# Patient Record
Sex: FEMALE | Race: BLACK OR AFRICAN AMERICAN | NOT HISPANIC OR LATINO | Employment: FULL TIME | URBAN - METROPOLITAN AREA
[De-identification: names, ages, dates, MRNs, and addresses within clinical notes are randomized per-mention and may not be internally consistent; named-entity substitution may affect disease eponyms.]

---

## 2023-04-05 ENCOUNTER — OFFICE VISIT (OUTPATIENT)
Dept: PRIMARY CARE | Facility: CLINIC | Age: 38
End: 2023-04-05
Payer: COMMERCIAL

## 2023-04-05 VITALS
HEIGHT: 66 IN | SYSTOLIC BLOOD PRESSURE: 118 MMHG | BODY MASS INDEX: 26.53 KG/M2 | TEMPERATURE: 97.6 F | HEART RATE: 78 BPM | OXYGEN SATURATION: 97 % | WEIGHT: 165.1 LBS | RESPIRATION RATE: 16 BRPM | DIASTOLIC BLOOD PRESSURE: 80 MMHG

## 2023-04-05 DIAGNOSIS — M54.50 LOW BACK PAIN, UNSPECIFIED BACK PAIN LATERALITY, UNSPECIFIED CHRONICITY, UNSPECIFIED WHETHER SCIATICA PRESENT: ICD-10-CM

## 2023-04-05 DIAGNOSIS — G43.909 MIGRAINE WITHOUT STATUS MIGRAINOSUS, NOT INTRACTABLE, UNSPECIFIED MIGRAINE TYPE: Primary | ICD-10-CM

## 2023-04-05 DIAGNOSIS — Z23 NEED FOR DTAP VACCINATION: ICD-10-CM

## 2023-04-05 DIAGNOSIS — F41.9 ANXIETY: ICD-10-CM

## 2023-04-05 PROBLEM — B36.0 TINEA VERSICOLOR: Status: ACTIVE | Noted: 2023-04-05

## 2023-04-05 PROBLEM — E55.9 VITAMIN D DEFICIENCY, UNSPECIFIED: Status: ACTIVE | Noted: 2023-04-05

## 2023-04-05 PROBLEM — L30.9 ECZEMA: Status: ACTIVE | Noted: 2023-04-05

## 2023-04-05 PROCEDURE — 90472 IMMUNIZATION ADMIN EACH ADD: CPT | Performed by: FAMILY MEDICINE

## 2023-04-05 PROCEDURE — 90739 HEPB VACC 2/4 DOSE ADULT IM: CPT | Performed by: FAMILY MEDICINE

## 2023-04-05 PROCEDURE — 1036F TOBACCO NON-USER: CPT | Performed by: FAMILY MEDICINE

## 2023-04-05 PROCEDURE — 99214 OFFICE O/P EST MOD 30 MIN: CPT | Performed by: FAMILY MEDICINE

## 2023-04-05 PROCEDURE — 90471 IMMUNIZATION ADMIN: CPT | Performed by: FAMILY MEDICINE

## 2023-04-05 PROCEDURE — 90715 TDAP VACCINE 7 YRS/> IM: CPT | Performed by: FAMILY MEDICINE

## 2023-04-05 RX ORDER — GABAPENTIN 300 MG/1
300 CAPSULE ORAL NIGHTLY
COMMUNITY
Start: 2022-10-31 | End: 2023-04-05 | Stop reason: ALTCHOICE

## 2023-04-05 RX ORDER — DULOXETIN HYDROCHLORIDE 30 MG/1
30 CAPSULE, DELAYED RELEASE ORAL DAILY
Qty: 60 CAPSULE | Refills: 0 | Status: SHIPPED | OUTPATIENT
Start: 2023-04-05 | End: 2023-05-23 | Stop reason: SDUPTHER

## 2023-04-05 RX ORDER — LORAZEPAM 1 MG/1
TABLET ORAL
COMMUNITY
Start: 2023-01-11 | End: 2023-10-10 | Stop reason: ALTCHOICE

## 2023-04-05 RX ORDER — RIZATRIPTAN BENZOATE 5 MG/1
TABLET ORAL
COMMUNITY
Start: 2022-10-31 | End: 2023-04-05 | Stop reason: ALTCHOICE

## 2023-04-05 RX ORDER — KETOCONAZOLE 20 MG/ML
SHAMPOO, SUSPENSION TOPICAL
COMMUNITY
Start: 2020-10-22 | End: 2023-10-10 | Stop reason: SDUPTHER

## 2023-04-05 RX ORDER — TRIAMCINOLONE ACETONIDE 1 MG/G
OINTMENT TOPICAL
COMMUNITY
Start: 2020-10-22 | End: 2023-10-10 | Stop reason: SDUPTHER

## 2023-04-05 RX ORDER — IBUPROFEN 600 MG/1
1 TABLET ORAL EVERY 6 HOURS PRN
COMMUNITY
Start: 2022-12-22 | End: 2023-11-16 | Stop reason: ALTCHOICE

## 2023-04-05 ASSESSMENT — PATIENT HEALTH QUESTIONNAIRE - PHQ9
2. FEELING DOWN, DEPRESSED OR HOPELESS: NOT AT ALL
SUM OF ALL RESPONSES TO PHQ9 QUESTIONS 1 AND 2: 0
1. LITTLE INTEREST OR PLEASURE IN DOING THINGS: NOT AT ALL

## 2023-04-05 ASSESSMENT — LIFESTYLE VARIABLES
HOW OFTEN DO YOU HAVE A DRINK CONTAINING ALCOHOL: NEVER
HOW OFTEN DO YOU HAVE SIX OR MORE DRINKS ON ONE OCCASION: NEVER

## 2023-04-05 NOTE — PATIENT INSTRUCTIONS
*Please accept my condolences for the loss of your loved one*    TDAP and hEP b #2 TODAY         MIGRAINES  - stop gabapentin since causing side effects  - start duloxetine to help with anxiety as well  -To call for referral for neurology headache specialist  -CT head reviewed from [facility]-was normal  -Distress can help!    Back pain  -Referral for physical therapy  -Try yoga and stretches to help as well  -Duloxetine can help as well    Anxiety  -Trial of duloxetine  -Can consider counseling if no improvement-we can discuss at her next appointment        Please follow up in 3 months for wellness or as needed.       ** If labs or imaging ordered at today's visit, all the non-urgent results will be discussed at your next visit    If you have been referred for a special test or to a specialist please call  [phone] to schedule an appointment.  If you have any further questions, or if develop new or worsened symptoms, please give our office a call at [phone].     Ways to Help Prevent Falls at Home    Quick Tips   ? Ask for help if you need it. Most people want to help!   ? Get up slowly after sitting or laying down   ? Wear a medical alert device or keep cell phone in your pocket   ? Use night lights, especially areas near a bathroom   ? Keep the items you use often within reach on a small stool or end table   ? Use an assistive device such as walker or cane, as directed by provider/physical therapy   ? Use a non-slip mat and grab bars in your bathroom. Look for home health sections for best options     Other Areas to Focus On   ? Exercise and nutrition: Regular exercise or taking a falls prevention class are great ways improve strength and balance. Don’t forget to stay hydrated and bring a snack!   ? Medicine side effects: Some medicines can make you sleepy or dizzy, which could cause a fall. Ask your healthcare provider about the side effects your medicines could cause. Be sure to let them know if  you take any vitamins or supplements as well.   ? Tripping hazards: Remove items you could trip on, such as loose mats, rugs, cords, and clutter. Wear closed toe shoes with rubber soles.   ? Health and wellness: Get regular checkups with your healthcare provider, plus routine vision and hearing screenings. Talk with your healthcare provider about:   o Your medicines and the possible side effects - bring them in a bag if that is easier!   o Problems with balance or feeling dizzy   o Ways to promote bone health, such as Vitamin D and calcium supplements   o Questions or concerns about falling     *Ask your healthcare team if you have questions     [facility], 2022         Ways to Help Prevent Falls at Home    Quick Tips   ? Ask for help if you need it. Most people want to help!   ? Get up slowly after sitting or laying down   ? Wear a medical alert device or keep cell phone in your pocket   ? Use night lights, especially areas near a bathroom   ? Keep the items you use often within reach on a small stool or end table   ? Use an assistive device such as walker or cane, as directed by provider/physical therapy   ? Use a non-slip mat and grab bars in your bathroom. Look for home health sections for best options     Other Areas to Focus On   ? Exercise and nutrition: Regular exercise or taking a falls prevention class are great ways improve strength and balance. Don’t forget to stay hydrated and bring a snack!   ? Medicine side effects: Some medicines can make you sleepy or dizzy, which could cause a fall. Ask your healthcare provider about the side effects your medicines could cause. Be sure to let them know if you take any vitamins or supplements as well.   ? Tripping hazards: Remove items you could trip on, such as loose mats, rugs, cords, and clutter. Wear closed toe shoes with rubber soles.   ? Health and wellness: Get regular checkups with your healthcare provider, plus routine vision and hearing  screenings. Talk with your healthcare provider about:   o Your medicines and the possible side effects - bring them in a bag if that is easier!   o Problems with balance or feeling dizzy   o Ways to promote bone health, such as Vitamin D and calcium supplements   o Questions or concerns about falling     *Ask your healthcare team if you have questions     ©[facility], 2022

## 2023-04-05 NOTE — PROGRESS NOTES
"Subjective   Patient ID: Jill Toledo is a 38 y.o. female who presents for Migraine.    Migraine        Due for hep b #2 and tdap  Migraines-  \"are horrible\"   was with a falland went to [facility] which they did do a CT of the head.  Reviewed on patient's [employer] account.  CT head was normal  Completed FMLA in the past for her headaches  At last appointment we started her on Gabapentin 300 mg at night- states \"I didn't like the way it feel\" and \"I dont feel like myself while using'  States it triggers her anxiety which has worsened.    Was given rizatriptan which she states did not help with the pain either.  Does not want to use any medications.  Also states her headaches are worsened around her monthly menstrual cycle.  Seen by gynecology and was with D&C/ablation to help with her heavy menstrual periods.  Declined using birth control  Referral was placed to see neurology but she had a death in her family, her father .  tried Amitriptyline in the past- made her too drowsy in the am  unsure if helped  .Onset of headaches:  Frequency of headaches (days per month): normally getting around period time, or can be 2-3times  Duration of headaches (average): nml lasting 1 day, but now lasting 2-3day  Severity of headaches (out of 10): 7-8/10  Aura:sometimes   Nausea/vomiting: +   Photophobia: +  Phonophobia: +    PMSFH was reviewed & updated.      ETOH - only social and very minimal (states father is EOTH)   Drugs - THC (daily) - not ready to quit yet  Tobacco - quit!!      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      Objective   /80 (Patient Position: Sitting)   Pulse 78   Temp 36.4 °C (97.6 °F)   Resp 16   Ht 1.676 m (5' 6\")   Wt " 74.9 kg (165 lb 1.6 oz)   SpO2 97%   BMI 26.65 kg/m²     Physical Exam  Vitals reviewed.   Constitutional:       Appearance: Normal appearance.   HENT:      Head: Normocephalic.   Eyes:      Extraocular Movements: Extraocular movements intact.   Cardiovascular:      Rate and Rhythm: Normal rate and regular rhythm.      Heart sounds: Normal heart sounds. No murmur heard.  Pulmonary:      Effort: Pulmonary effort is normal. No respiratory distress.      Breath sounds: Normal breath sounds. No wheezing.   Abdominal:      General: Bowel sounds are normal. There is no distension.      Palpations: Abdomen is soft.   Skin:     General: Skin is warm and dry.   Neurological:      General: No focal deficit present.      Mental Status: She is alert and oriented to person, place, and time.   Psychiatric:         Mood and Affect: Mood normal.         Behavior: Behavior normal.         Thought Content: Thought content normal.         Judgment: Judgment normal.         Assessment/Plan   Problem List Items Addressed This Visit          Other    Migraines - Primary    Relevant Medications    DULoxetine (Cymbalta) 30 mg DR capsule    Other Relevant Orders    CBC and Auto Differential    Comprehensive Metabolic Panel    Lipid Panel    TSH with reflex to Free T4 if abnormal    Referral to Neurology     Other Visit Diagnoses       Need for DTaP vaccination        Relevant Orders    Hepatitis B vaccine, adult (HEPLISAV)    Tdap vaccine, age 10 years and older (BOOSTRIX)    Anxiety        Relevant Medications    DULoxetine (Cymbalta) 30 mg DR capsule    Low back pain, unspecified back pain laterality, unspecified chronicity, unspecified whether sciatica present        Relevant Orders    Referral to Physical Therapy             Patient was identified as a fall risk. Risk prevention instructions provided.Patient was identified as a fall risk. Risk prevention instructions provided.

## 2023-05-23 DIAGNOSIS — F41.9 ANXIETY: ICD-10-CM

## 2023-05-23 DIAGNOSIS — G43.909 MIGRAINE WITHOUT STATUS MIGRAINOSUS, NOT INTRACTABLE, UNSPECIFIED MIGRAINE TYPE: ICD-10-CM

## 2023-05-24 RX ORDER — DULOXETIN HYDROCHLORIDE 60 MG/1
60 CAPSULE, DELAYED RELEASE ORAL DAILY
Qty: 90 CAPSULE | Refills: 0 | Status: SHIPPED | OUTPATIENT
Start: 2023-05-24 | End: 2023-10-10 | Stop reason: ALTCHOICE

## 2023-07-27 ENCOUNTER — APPOINTMENT (OUTPATIENT)
Dept: PRIMARY CARE | Facility: CLINIC | Age: 38
End: 2023-07-27
Payer: COMMERCIAL

## 2023-09-20 ENCOUNTER — TELEPHONE (OUTPATIENT)
Dept: PRIMARY CARE | Facility: CLINIC | Age: 38
End: 2023-09-20

## 2023-09-20 NOTE — TELEPHONE ENCOUNTER
Patient stated the following:  Constant migraines for over 4 days     Patient stated that she has been doing well for 3 months and the headaches have recently returned     Patient stated that she has been taking Excedrin Migraine relief and they have not been helping    Patient has the following appointments scheduled:  Physical - 10/10  Follow up - 01/26    Is patient ok for sick/7:40 slot? Or VV

## 2023-10-10 ENCOUNTER — OFFICE VISIT (OUTPATIENT)
Dept: PRIMARY CARE | Facility: CLINIC | Age: 38
End: 2023-10-10
Payer: COMMERCIAL

## 2023-10-10 VITALS
BODY MASS INDEX: 25.38 KG/M2 | OXYGEN SATURATION: 98 % | SYSTOLIC BLOOD PRESSURE: 104 MMHG | WEIGHT: 157.9 LBS | HEIGHT: 66 IN | RESPIRATION RATE: 15 BRPM | TEMPERATURE: 97.4 F | HEART RATE: 74 BPM | DIASTOLIC BLOOD PRESSURE: 82 MMHG

## 2023-10-10 DIAGNOSIS — Z23 NEED FOR VACCINATION: ICD-10-CM

## 2023-10-10 DIAGNOSIS — G43.909 MIGRAINE WITHOUT STATUS MIGRAINOSUS, NOT INTRACTABLE, UNSPECIFIED MIGRAINE TYPE: ICD-10-CM

## 2023-10-10 DIAGNOSIS — Z80.0 FAMILY HISTORY OF STOMACH CANCER: ICD-10-CM

## 2023-10-10 DIAGNOSIS — L30.9 ECZEMA, UNSPECIFIED TYPE: ICD-10-CM

## 2023-10-10 DIAGNOSIS — Z00.00 WELLNESS EXAMINATION: Primary | ICD-10-CM

## 2023-10-10 PROBLEM — F41.9 ANXIETY: Status: ACTIVE | Noted: 2023-02-04

## 2023-10-10 PROCEDURE — 90472 IMMUNIZATION ADMIN EACH ADD: CPT | Performed by: FAMILY MEDICINE

## 2023-10-10 PROCEDURE — 99396 PREV VISIT EST AGE 40-64: CPT | Performed by: FAMILY MEDICINE

## 2023-10-10 PROCEDURE — 90471 IMMUNIZATION ADMIN: CPT | Performed by: FAMILY MEDICINE

## 2023-10-10 PROCEDURE — 90686 IIV4 VACC NO PRSV 0.5 ML IM: CPT | Performed by: FAMILY MEDICINE

## 2023-10-10 PROCEDURE — 90651 9VHPV VACCINE 2/3 DOSE IM: CPT | Performed by: FAMILY MEDICINE

## 2023-10-10 PROCEDURE — 99213 OFFICE O/P EST LOW 20 MIN: CPT | Performed by: FAMILY MEDICINE

## 2023-10-10 PROCEDURE — 1036F TOBACCO NON-USER: CPT | Performed by: FAMILY MEDICINE

## 2023-10-10 RX ORDER — KETOCONAZOLE 20 MG/ML
SHAMPOO, SUSPENSION TOPICAL
Qty: 120 ML | Refills: 0 | Status: SHIPPED | OUTPATIENT
Start: 2023-10-10 | End: 2023-11-16 | Stop reason: ALTCHOICE

## 2023-10-10 RX ORDER — ACETAMINOPHEN 325 MG/1
650 TABLET ORAL EVERY 6 HOURS PRN
COMMUNITY
Start: 2023-02-10 | End: 2023-11-16 | Stop reason: ALTCHOICE

## 2023-10-10 RX ORDER — TRIAMCINOLONE ACETONIDE 1 MG/G
OINTMENT TOPICAL
Qty: 453 G | Refills: 0 | Status: SHIPPED | OUTPATIENT
Start: 2023-10-10 | End: 2023-11-16 | Stop reason: ALTCHOICE

## 2023-10-10 ASSESSMENT — LIFESTYLE VARIABLES
HOW OFTEN DO YOU HAVE SIX OR MORE DRINKS ON ONE OCCASION: NEVER
SKIP TO QUESTIONS 9-10: 1
HOW OFTEN DO YOU HAVE A DRINK CONTAINING ALCOHOL: NEVER
AUDIT-C TOTAL SCORE: 0
HOW MANY STANDARD DRINKS CONTAINING ALCOHOL DO YOU HAVE ON A TYPICAL DAY: PATIENT DOES NOT DRINK

## 2023-10-10 ASSESSMENT — PATIENT HEALTH QUESTIONNAIRE - PHQ9
SUM OF ALL RESPONSES TO PHQ9 QUESTIONS 1 & 2: 0
2. FEELING DOWN, DEPRESSED OR HOPELESS: NOT AT ALL
1. LITTLE INTEREST OR PLEASURE IN DOING THINGS: NOT AT ALL

## 2023-10-10 NOTE — PROGRESS NOTES
"Subjective   Patient ID: Jill Toledo is a 38 y.o. female who presents for Annual Exam.    HPI   Here for a physical  Does not want a chaperone.     Other concerns/issues/followup:  1 - Headache  Has worsened-lasting 1 wk ( after increased stress due to multiple deaths in her family.since then her headaches have improved   In the past has tried multiple treatments including sumatriptan, rizatriptan, duloxetine, gabapentin, amitriptyline -all either caused side effects or did not help her with her pain at all.  Referrals made to neurology in the past was not able to follow-up however     2 -multiple cancer deaths in her family including her grandmother, 2 aunts and a cousin all with stomach cancer   Denies any abdominal pain, change in bowel movements, nausea, vomiting or weight loss.     [facility] was reviewed & updated.        ---Social---  Job: [employer] - [employer]  :  1yr now  Kids: 2 kids (11/12) and (3/12)- has custody of her family's children  Likes: loves motivating people, time with kids  No foreign travel plans         ETOH - none  Drugs - none  Tobacco - none      Review of Systems  All systems reviewed and neg if not noted in the HPI above    Objective   /82   Pulse 74   Temp 36.3 °C (97.4 °F)   Resp 15   Ht 1.676 m (5' 6\")   Wt 71.6 kg (157 lb 14.4 oz)   SpO2 98%   BMI 25.49 kg/m²     Physical Exam  Pleasant  Eyes: conjunctiva non-icteric and eye lids are without obvious rash or drooping. Pupils are symmetric.   Ears, Nose, Mouth, and Throat: External ears and nose appear to be without deformity or rash. No lesions or masses noted. Hearing is grossly intact.   CV: RRR, no murmur  Carotids: no bruits  Pulm:CTA B/L  Abd: soft, NTTP, + BS  LE: no edema  Psychiatric: Alert, orientation to person, place, and time. Recent/remote memory as evidenced through face-to-face interaction and discussion appear grossly intact. Mood and affect are normal.     Assessment/Plan   Problem List " Items Addressed This Visit             ICD-10-CM    Eczema L30.9    Relevant Medications    ketoconazole (NIZOral) 2 % shampoo    triamcinolone (Kenalog) 0.1 % ointment    Migraines G43.909    Relevant Orders    Referral to Neurology     Other Visit Diagnoses         Codes    Wellness examination    -  Primary Z00.00    Need for vaccination     Z23    Family history of stomach cancer     Z80.0    Relevant Orders    Referral to Genetics               Please follow up in  1 yr wellness  or as needed.

## 2023-10-10 NOTE — PATIENT INSTRUCTIONS
*Please accept my condolences for the loss of your loved one*    HPV and FLU today    Labs today    Headaches  - referral neurology again    Fm hx of cancers  -Referral for genetic      Please follow up in  1 yr wellness  or as needed.       ** If labs or imaging ordered at today's visit, all the non-urgent results will be discussed at your next visit    If you have been referred for a special test or to a specialist please call  [phone] to schedule an appointment.  If you have any further questions, or if develop new or worsened symptoms, please give our office a call at [phone].

## 2023-10-17 ENCOUNTER — HOSPITAL ENCOUNTER (EMERGENCY)
Facility: HOSPITAL | Age: 38
Discharge: HOME | End: 2023-10-17
Attending: EMERGENCY MEDICINE
Payer: COMMERCIAL

## 2023-10-17 VITALS
WEIGHT: 157 LBS | TEMPERATURE: 98.1 F | SYSTOLIC BLOOD PRESSURE: 108 MMHG | RESPIRATION RATE: 16 BRPM | BODY MASS INDEX: 26.16 KG/M2 | DIASTOLIC BLOOD PRESSURE: 71 MMHG | OXYGEN SATURATION: 100 % | HEIGHT: 65 IN | HEART RATE: 73 BPM

## 2023-10-17 DIAGNOSIS — G43.909 MIGRAINE WITHOUT STATUS MIGRAINOSUS, NOT INTRACTABLE, UNSPECIFIED MIGRAINE TYPE: Primary | ICD-10-CM

## 2023-10-17 PROCEDURE — 96375 TX/PRO/DX INJ NEW DRUG ADDON: CPT

## 2023-10-17 PROCEDURE — 99284 EMERGENCY DEPT VISIT MOD MDM: CPT | Mod: 25

## 2023-10-17 PROCEDURE — 96374 THER/PROPH/DIAG INJ IV PUSH: CPT

## 2023-10-17 PROCEDURE — 96361 HYDRATE IV INFUSION ADD-ON: CPT

## 2023-10-17 PROCEDURE — 2500000004 HC RX 250 GENERAL PHARMACY W/ HCPCS (ALT 636 FOR OP/ED): Performed by: EMERGENCY MEDICINE

## 2023-10-17 RX ORDER — KETOROLAC TROMETHAMINE 30 MG/ML
15 INJECTION, SOLUTION INTRAMUSCULAR; INTRAVENOUS ONCE
Status: COMPLETED | OUTPATIENT
Start: 2023-10-17 | End: 2023-10-17

## 2023-10-17 RX ORDER — METOCLOPRAMIDE 10 MG/1
10 TABLET ORAL EVERY 8 HOURS PRN
Qty: 30 TABLET | Refills: 0 | Status: SHIPPED | OUTPATIENT
Start: 2023-10-17 | End: 2023-11-16 | Stop reason: ALTCHOICE

## 2023-10-17 RX ORDER — DIPHENHYDRAMINE HYDROCHLORIDE 50 MG/ML
25 INJECTION INTRAMUSCULAR; INTRAVENOUS ONCE
Status: COMPLETED | OUTPATIENT
Start: 2023-10-17 | End: 2023-10-17

## 2023-10-17 RX ORDER — METOCLOPRAMIDE HYDROCHLORIDE 5 MG/ML
10 INJECTION INTRAMUSCULAR; INTRAVENOUS ONCE
Status: COMPLETED | OUTPATIENT
Start: 2023-10-17 | End: 2023-10-17

## 2023-10-17 RX ADMIN — SODIUM CHLORIDE 1000 ML: 900 INJECTION, SOLUTION INTRAVENOUS at 21:59

## 2023-10-17 RX ADMIN — METOCLOPRAMIDE HYDROCHLORIDE 10 MG: 5 INJECTION INTRAMUSCULAR; INTRAVENOUS at 21:59

## 2023-10-17 RX ADMIN — KETOROLAC TROMETHAMINE 15 MG: 30 INJECTION, SOLUTION INTRAMUSCULAR; INTRAVENOUS at 21:59

## 2023-10-17 RX ADMIN — DIPHENHYDRAMINE HYDROCHLORIDE 25 MG: 50 INJECTION, SOLUTION INTRAMUSCULAR; INTRAVENOUS at 21:59

## 2023-10-17 ASSESSMENT — COLUMBIA-SUICIDE SEVERITY RATING SCALE - C-SSRS
6. HAVE YOU EVER DONE ANYTHING, STARTED TO DO ANYTHING, OR PREPARED TO DO ANYTHING TO END YOUR LIFE?: NO
1. IN THE PAST MONTH, HAVE YOU WISHED YOU WERE DEAD OR WISHED YOU COULD GO TO SLEEP AND NOT WAKE UP?: NO
2. HAVE YOU ACTUALLY HAD ANY THOUGHTS OF KILLING YOURSELF?: NO

## 2023-10-17 ASSESSMENT — LIFESTYLE VARIABLES
REASON UNABLE TO ASSESS: NO
HAVE YOU EVER FELT YOU SHOULD CUT DOWN ON YOUR DRINKING: NO
EVER HAD A DRINK FIRST THING IN THE MORNING TO STEADY YOUR NERVES TO GET RID OF A HANGOVER: NO
HAVE PEOPLE ANNOYED YOU BY CRITICIZING YOUR DRINKING: NO
EVER FELT BAD OR GUILTY ABOUT YOUR DRINKING: NO

## 2023-10-17 ASSESSMENT — PAIN DESCRIPTION - PAIN TYPE: TYPE: ACUTE PAIN

## 2023-10-17 ASSESSMENT — PAIN - FUNCTIONAL ASSESSMENT: PAIN_FUNCTIONAL_ASSESSMENT: 0-10

## 2023-10-17 ASSESSMENT — PAIN SCALES - GENERAL: PAINLEVEL_OUTOF10: 8

## 2023-10-17 NOTE — Clinical Note
Jill Toledo was seen and treated in our emergency department on 10/17/2023.  She may return to work on 10/19/2023.       If you have any questions or concerns, please don't hesitate to call.      Nataliia Wilson MD

## 2023-10-18 NOTE — ED PROVIDER NOTES
HPI   Chief Complaint   Patient presents with    Headache     Pt to ED for migraine that started on  night while sleeping. Pt woke up yesterday had nausea and vomited. Pt woke up and went to work today and felt that the pain continued to increase. Pt has been taking excedrin migraine.       38-year-old female with a history of migraine headaches comes to the emergency department with a chief complaint of headache.  Over the last few days the patient has had an intractable headache described as left-sided associated with nausea and vomiting and aura.  She had similar headaches prior to getting a uterine ablation for abnormal menses.  She had been well controlled for several months, but over the last couple of months she has had recurrent headaches and has a follow-up appointment with a neurologist.  She comes in today because this headache has not improved with her normal medications.  It is not the worst headache of her life, did not come on suddenly and did not start while she was under exertion.  She denies any focal neurological deficits including weakness, vision loss, confusion or sensory deficit      History provided by:  Patient   used: No                        Harman Coma Scale Score: 15                  Patient History   Past Medical History:   Diagnosis Date    Other signs and symptoms in breast 10/25/2020    Nipple problem    Personal history of other mental and behavioral disorders 10/22/2020    History of depression    Tobacco abuse counseling 10/22/2020    Encounter for smoking cessation counseling     Past Surgical History:   Procedure Laterality Date    OTHER SURGICAL HISTORY  10/22/2020    Tubal ligation bilateral    OTHER SURGICAL HISTORY  10/22/2020     section     Family History   Problem Relation Name Age of Onset    Hypertension Mother      Alcohol abuse Father      Kidney failure Father       Social History     Tobacco Use    Smoking status: Never     Smokeless tobacco: Never   Substance Use Topics    Alcohol use: Never    Drug use: Yes     Types: Marijuana       Physical Exam   ED Triage Vitals [10/17/23 2055]   Temp Heart Rate Resp BP   36.7 °C (98.1 °F) 83 18 (!) 125/96      SpO2 Temp Source Heart Rate Source Patient Position   98 % Temporal -- Sitting      BP Location FiO2 (%)     Right arm --       Physical Exam  Vitals and nursing note reviewed.   Constitutional:       General: She is not in acute distress.     Appearance: She is well-developed.   HENT:      Head: Normocephalic and atraumatic.   Eyes:      Conjunctiva/sclera: Conjunctivae normal.   Cardiovascular:      Rate and Rhythm: Normal rate and regular rhythm.      Heart sounds: No murmur heard.  Pulmonary:      Effort: Pulmonary effort is normal. No respiratory distress.      Breath sounds: Normal breath sounds.   Abdominal:      Palpations: Abdomen is soft.      Tenderness: There is no abdominal tenderness.   Musculoskeletal:         General: No swelling.      Cervical back: Neck supple.   Skin:     General: Skin is warm and dry.      Capillary Refill: Capillary refill takes less than 2 seconds.   Neurological:      Mental Status: She is alert.   Psychiatric:         Mood and Affect: Mood normal.         ED Course & MDM   Diagnoses as of 10/17/23 2332   Migraine without status migrainosus, not intractable, unspecified migraine type       Medical Decision Making    HPI:  As Above  PMHx/PSHx/Meds/Allergies/SH/FH as per nursing documentation and reviewed.  Review of systems: Total of 10 systems reviewed and otherwise negative except as noted elsewhere    DDX: As described in MDM    If performed, radiology listed above interpreted by me and confirmed by the Radiologist.  Medications administered during this visit (name and route): see MAR  Social determinants of health considered for this visit: Lives at home  If performed, EKG interpreted by me and detailed above    MDM  Summary/considerations:  38-year-old female presenting with recurrent migraine headache.  Patient was treated with IV normal saline, IV Reglan, Benadryl and Toradol with significant improvement of headache.  Patient states that her pain is now manageable and that she will follow-up with the neurologist's appointment.  She is also instructed to follow-up with her primary care provider in 2 to 3 days.  She will be given a prescription for Reglan and instructed to use this medication with Benadryl and over-the-counter analgesics such as Tylenol, ibuprofen or naproxen.  She is also given instructions on how to complete a headache journal.    Prescriptions provided include: Oral Reglan    The patient was seen and triaged by our nursing/medic staff, their vitals were taken and the staff notes were reviewed.  If the patient arrived by an EMS squad or an outside agency, we discussed the case with transporting EMS medic, police, or other historians. My initial assessment was attention to their airway, breathing, and circulatory status.  We addressed any immediate or life threatening findings and completed a medical history and a physical exam if the patient or those legally responsible were in agreement with this.   Prior to the patient being discharged, I or my PA/NP or the nursing staff discussed the differential, results and discharge plan with the patient and/or family/friend/caregiver if present.  I emphasized the importance of follow-up in 2-3 days unless otherwise specified.  I explained reasons for the patient to return to the Emergency Department. Additional verbal discharge instructions were also given and discussed with the patient to supplement those generated by the EMR. We also discussed medications that were prescribed (if any) including common side effects and interactions. The patient was advised to abstain from driving, operating heavy machinery or making significant decisions while taking medications such  as antihistamines, benzodiazepines, opiates and muscle relaxers. All questions were addressed.  They understand return precautions and discharge instructions. The patient and/or family/friend/caregiver expressed understanding.  **Disclaimer:  This note was dictated by speech recognition technology.  Minor errors in transcription may be present.  Please contact for clarification or corrections.    In the case the patient eloped or refused treatment/admission, we offered to the best of our ability to provide care to the patient at the time of this encounter.        Procedure  Procedures     Nataliia Wilson MD  10/18/23 0867

## 2023-11-16 ENCOUNTER — OFFICE VISIT (OUTPATIENT)
Dept: NEUROLOGY | Facility: HOSPITAL | Age: 38
End: 2023-11-16
Payer: COMMERCIAL

## 2023-11-16 VITALS
DIASTOLIC BLOOD PRESSURE: 90 MMHG | RESPIRATION RATE: 18 BRPM | SYSTOLIC BLOOD PRESSURE: 131 MMHG | TEMPERATURE: 97.1 F | WEIGHT: 163 LBS | HEART RATE: 72 BPM | HEIGHT: 66 IN | BODY MASS INDEX: 26.2 KG/M2

## 2023-11-16 DIAGNOSIS — G43.E11 INTRACTABLE CHRONIC MIGRAINE WITH AURA WITH STATUS MIGRAINOSUS: Primary | ICD-10-CM

## 2023-11-16 DIAGNOSIS — G43.909 MIGRAINE WITHOUT STATUS MIGRAINOSUS, NOT INTRACTABLE, UNSPECIFIED MIGRAINE TYPE: ICD-10-CM

## 2023-11-16 PROCEDURE — 99205 OFFICE O/P NEW HI 60 MIN: CPT | Performed by: PSYCHIATRY & NEUROLOGY

## 2023-11-16 PROCEDURE — 99215 OFFICE O/P EST HI 40 MIN: CPT | Mod: GC | Performed by: PSYCHIATRY & NEUROLOGY

## 2023-11-16 PROCEDURE — 1036F TOBACCO NON-USER: CPT | Performed by: PSYCHIATRY & NEUROLOGY

## 2023-11-16 RX ORDER — ZOLMITRIPTAN 5 MG/1
1 SPRAY NASAL AS NEEDED
Qty: 6 ML | Refills: 2 | Status: SHIPPED | OUTPATIENT
Start: 2023-11-16 | End: 2024-11-15

## 2023-11-16 RX ORDER — PROPRANOLOL HYDROCHLORIDE 10 MG/1
10 TABLET ORAL 2 TIMES DAILY
Qty: 60 TABLET | Refills: 2 | Status: SHIPPED | OUTPATIENT
Start: 2023-11-16 | End: 2023-11-16 | Stop reason: SDUPTHER

## 2023-11-16 RX ORDER — PROPRANOLOL HYDROCHLORIDE 10 MG/1
20 TABLET ORAL 2 TIMES DAILY
Qty: 60 TABLET | Refills: 2 | Status: SHIPPED | OUTPATIENT
Start: 2023-11-16 | End: 2024-11-15

## 2023-11-16 ASSESSMENT — PAIN SCALES - GENERAL: PAINLEVEL: 0-NO PAIN

## 2023-11-16 NOTE — PROGRESS NOTES
"Subjective     Chief Complaint: Headache    Jill Toledo is a 38 y.o. year old female who presents with chief complaint of headaches.    HPI  Jill Toledo is a 38 y.o. year old female who presents with chief complaint of headaches.     Pt reports a headache that starts on the top and front of her head in a throbbing quality and increases in severity until it reaches 7-8/10. It peaks in around 3-5h and she starts experiencing nausea, vomiting, photophobia and phonophobia. She can get clammy but attributes that to vomiting. She also sees lights and blurring in her visual field at that time but could not describe character or duration. She mostly lays down in a dark room with her eyes closed. In the past, the headache used to last 1-3 days but during the past few weeks, the headache has been lasting to 5-7 days. The patient had these headaches for years but had not tried medications until she moved from [place] to [place] and established care with her PCP in 2020. Per chart review, pt's migraines started increasing in duration in 2020 (lasting to 2-3 days up from 1 day). Frequency is also recorded as 2-3 times/month then.     She denies somatosensory aura, motor weakness but reports generalized fatigue with the headache. The headache worsens with activity. She denies neck stiffness; she has \"pinched nerves\" in her cervical spine based on images she recalls.    The patient takes excedrin migraine 1-2 and 2 benadryl when sensing a migraine which sometimes works to prevent a full episode.    Pt's sleep is interrupted typically with awakening twice a night for the past 2 months. She feels this is a pattern she experiences during Falls. Moreover, she has been under increased stress from losing people close to her and reports significant anxiety. She denies depression and SI.    Per PCP notes, pt tried sumatriptan, rizatriptan including taking  more than one pill at a time, without relief. She also reportedly tried " "duloxetine, gabapentin, amitriptyline;; the side effect of which she reports was increased sleepiness the following morning which interfered with work.     Most recent brain imaging: CTH 12/22/2022 [facility] - NO EVIDENCE OF ACUTE INTRACRANIAL PROCESS.  NORMAL EXAMINATION.   Pt preferred not to have MRI and had postponed neurology appointment for that reason.      PMH as above  PSH [place], 2 CS, tubal ligation  SH drinks socially, drinks 1-1.5 coffee cup/day, no energy/soft drinks, smokes marijuana daily  FH Daughter with migraine  NKDA   ROS: As per HPI, otherwise all other systems have been reviewed are negative for complaint.        Objective   /90   Pulse 72   Temp 36.2 °C (97.1 °F)   Resp 18   Ht 1.676 m (5' 6\")   Wt 73.9 kg (163 lb)   BMI 26.31 kg/m²     Neurological Exam  Physical Exam  Neuro Exam:  Cardiac Exam: No apparent edema of b/l lower extremities  Neurological Exam:  MENTAL STATUS:   General Appearance: No distress, alert, interactive, and cooperative. Orientation was normal to time, place and person. Recent and remote memory was intact.     OPHTHALMOSCOPIC: unable to visualize optic discs    CRANIAL NERVES:   CN 2         Visual fields full to confrontation.   CN 3, 4, 6   Pupils round, 4 mm in diameter, equally reactive to light. Lids symmetric; no ptosis. EOMs normal alignment, full range with normal saccades, pursuit and convergence.   No nystagmus.   CN 5   Facial sensation intact bilaterally.   CN 7   Normal and symmetric facial strength. Nasolabial folds symmetric.   CN 8   Hearing intact to conversation and finger rub.  CN 9, 10   Palate elevates symmetrically.  CN 11   Normal strength of shoulder shrug and neck turning.   CN 12   Tongue midline, with normal bulk and strength; no fasciculations.     MOTOR:   Muscle bulk and tone were normal in both upper and lower extremities.   No pronator drift bilaterally.  No fasciculations, tremor or other abnormal movements evident with the " patient examined clothed.    STRENGTH:  R  L  Deltoid            5          5  Biceps  5 5  Triceps  5 5    Hip flexion 5 5  Knee Flex 5 5  Knee Ex 5 5    REFLEXES: R L  Biceps  2 2                     Triceps  2 2  Patellar  2 2     SENSORY:   In both upper and lower extremities, sensation was intact to light touch.    COORDINATION:   In both upper extremities, finger-nose-finger was intact without dysmetria or overshoot.     GAIT:   Station was stable with a normal base. Gait was stable with a normal arm swing and speed. No ataxia, shuffling, steppage or waddling was present. No circumduction was present. No Romberg sign was present.               Assessment/Plan   Jill Toledo is a 38 y.o. year old female who presents with chief complaint of headaches. Pt's presentation is consistent with migraine with visual aura, possibly with history of status migrainosus. PCP reports failed trials of several abortive and preventative medications. Pt agreeable to try alternative route of administration and alternative preventive agents.    - Start Zolmitriptan 5mg intranasally prn  - Start propranolol 20mg BID  - F/u in 3 months    Plan discussed with the staffing attending physician.    Meet Blancas  PGY-2 Neurology Resident

## 2023-11-16 NOTE — PATIENT INSTRUCTIONS
MsCristian Tre, you were seen by Dr. Schuler and the resident Dr. Blancas. It was a privilege seeing you today.    We think you have chronic migraine. We will prescribe Zolmitriptan spray to take as soon and as you feel a migraine starting. You can repeat a dose in 2 hours. Also, we will start Propranolol to take two times daily every day.     You should keep a headache diary including time, severity, triggers, and character. You should also cut down on marijuana use.    It helps that you follow the guidelines below.    To prevent medication overuse headache:  · Take your headache medication as prescribed.  · Avoid medications that contain butalbital or opioids.  · Use OTC painkillers less than 10-15 days a month.  · Limit use of triptans or combination analgesics to no more than nine days a month.    General guidelines that can help prevent most headaches:  · Recognize headache triggers: common triggers include fasting, sleep deprivation, menstrual cycle, red wine, dark chocolate, aged cheese and diet fizzy drinks. Keep a headache diary with details about every headache. Eventually, you may see a pattern.  · Get enough sleep. Go to bed and wake up at the same time every day - even on weekends.  · Stay hydrated. Be sure to drink plenty of water or other uncaffeinated fluids.  · Exercise regularly and reduce stress.   · Lose weight. Obesity can contribute to headache development, so if you need to lose weight, find a program that works for you.  · Quit smoking. If you smoke, talk to your doctor about quitting. Smoking is linked to a higher risk of medication overuse headache.    We will see you in 3 months.

## 2024-02-16 ENCOUNTER — APPOINTMENT (OUTPATIENT)
Dept: NEUROLOGY | Facility: HOSPITAL | Age: 39
End: 2024-02-16
Payer: COMMERCIAL

## 2024-09-23 ENCOUNTER — TELEPHONE (OUTPATIENT)
Dept: PRIMARY CARE | Facility: CLINIC | Age: 39
End: 2024-09-23
Payer: COMMERCIAL

## 2024-09-23 DIAGNOSIS — Z00.00 WELLNESS EXAMINATION: Primary | ICD-10-CM

## 2024-09-23 NOTE — TELEPHONE ENCOUNTER
Patient has an upcoming appt on 10/10/24 . Never got labs done last year patient wants to know if Dr is able to put in labs before upcoming physical. Also patient is on self pay and just wants to get everything done ahead of time.   Please Advise, TY

## 2024-10-07 ENCOUNTER — LAB (OUTPATIENT)
Dept: LAB | Facility: LAB | Age: 39
End: 2024-10-07
Payer: COMMERCIAL

## 2024-10-07 DIAGNOSIS — N63.0 MASS OF NIPPLE: ICD-10-CM

## 2024-10-07 DIAGNOSIS — N64.52 NIPPLE DISCHARGE: ICD-10-CM

## 2024-10-07 DIAGNOSIS — Z00.00 WELLNESS EXAMINATION: ICD-10-CM

## 2024-10-07 LAB
ALBUMIN SERPL BCP-MCNC: 4.2 G/DL (ref 3.4–5)
ALP SERPL-CCNC: 68 U/L (ref 33–110)
ALT SERPL W P-5'-P-CCNC: 20 U/L (ref 7–45)
ANION GAP SERPL CALC-SCNC: 9 MMOL/L (ref 10–20)
AST SERPL W P-5'-P-CCNC: 17 U/L (ref 9–39)
BASOPHILS # BLD AUTO: 0.03 X10*3/UL (ref 0–0.1)
BASOPHILS NFR BLD AUTO: 0.8 %
BILIRUB SERPL-MCNC: 0.8 MG/DL (ref 0–1.2)
BUN SERPL-MCNC: 13 MG/DL (ref 6–23)
CALCIUM SERPL-MCNC: 9.6 MG/DL (ref 8.6–10.6)
CHLORIDE SERPL-SCNC: 105 MMOL/L (ref 98–107)
CHOLEST SERPL-MCNC: 153 MG/DL (ref 0–199)
CHOLESTEROL/HDL RATIO: 2.1
CO2 SERPL-SCNC: 28 MMOL/L (ref 21–32)
CREAT SERPL-MCNC: 0.69 MG/DL (ref 0.5–1.05)
EGFRCR SERPLBLD CKD-EPI 2021: >90 ML/MIN/1.73M*2
EOSINOPHIL # BLD AUTO: 0.18 X10*3/UL (ref 0–0.7)
EOSINOPHIL NFR BLD AUTO: 4.6 %
ERYTHROCYTE [DISTWIDTH] IN BLOOD BY AUTOMATED COUNT: 12.3 % (ref 11.5–14.5)
GLUCOSE SERPL-MCNC: 83 MG/DL (ref 74–99)
HCT VFR BLD AUTO: 42.6 % (ref 36–46)
HDLC SERPL-MCNC: 71.9 MG/DL
HGB BLD-MCNC: 14 G/DL (ref 12–16)
IMM GRANULOCYTES # BLD AUTO: 0 X10*3/UL (ref 0–0.7)
IMM GRANULOCYTES NFR BLD AUTO: 0 % (ref 0–0.9)
LDLC SERPL CALC-MCNC: 71 MG/DL
LYMPHOCYTES # BLD AUTO: 1.69 X10*3/UL (ref 1.2–4.8)
LYMPHOCYTES NFR BLD AUTO: 43.6 %
MCH RBC QN AUTO: 30 PG (ref 26–34)
MCHC RBC AUTO-ENTMCNC: 32.9 G/DL (ref 32–36)
MCV RBC AUTO: 91 FL (ref 80–100)
MONOCYTES # BLD AUTO: 0.38 X10*3/UL (ref 0.1–1)
MONOCYTES NFR BLD AUTO: 9.8 %
NEUTROPHILS # BLD AUTO: 1.6 X10*3/UL (ref 1.2–7.7)
NEUTROPHILS NFR BLD AUTO: 41.2 %
NON HDL CHOLESTEROL: 81 MG/DL (ref 0–149)
NRBC BLD-RTO: 0 /100 WBCS (ref 0–0)
PLATELET # BLD AUTO: 212 X10*3/UL (ref 150–450)
POTASSIUM SERPL-SCNC: 4.1 MMOL/L (ref 3.5–5.3)
PROT SERPL-MCNC: 7.3 G/DL (ref 6.4–8.2)
RBC # BLD AUTO: 4.67 X10*6/UL (ref 4–5.2)
SODIUM SERPL-SCNC: 138 MMOL/L (ref 136–145)
TRIGL SERPL-MCNC: 53 MG/DL (ref 0–149)
TSH SERPL-ACNC: 1.15 MIU/L (ref 0.44–3.98)
VLDL: 11 MG/DL (ref 0–40)
WBC # BLD AUTO: 3.9 X10*3/UL (ref 4.4–11.3)

## 2024-10-07 PROCEDURE — 80053 COMPREHEN METABOLIC PANEL: CPT

## 2024-10-07 PROCEDURE — 85025 COMPLETE CBC W/AUTO DIFF WBC: CPT

## 2024-10-07 PROCEDURE — 36415 COLL VENOUS BLD VENIPUNCTURE: CPT

## 2024-10-07 PROCEDURE — 84146 ASSAY OF PROLACTIN: CPT

## 2024-10-07 PROCEDURE — 80061 LIPID PANEL: CPT

## 2024-10-07 PROCEDURE — 84443 ASSAY THYROID STIM HORMONE: CPT

## 2024-10-10 ENCOUNTER — APPOINTMENT (OUTPATIENT)
Dept: PRIMARY CARE | Facility: CLINIC | Age: 39
End: 2024-10-10
Payer: COMMERCIAL

## 2024-10-10 VITALS
DIASTOLIC BLOOD PRESSURE: 88 MMHG | HEART RATE: 88 BPM | HEIGHT: 66 IN | TEMPERATURE: 97 F | OXYGEN SATURATION: 98 % | BODY MASS INDEX: 29.09 KG/M2 | RESPIRATION RATE: 14 BRPM | SYSTOLIC BLOOD PRESSURE: 122 MMHG | WEIGHT: 181 LBS

## 2024-10-10 DIAGNOSIS — N63.20 MASS OF LEFT BREAST, UNSPECIFIED QUADRANT: Primary | ICD-10-CM

## 2024-10-10 DIAGNOSIS — Z12.31 ENCOUNTER FOR SCREENING MAMMOGRAM FOR BREAST CANCER: ICD-10-CM

## 2024-10-10 DIAGNOSIS — N64.52 NIPPLE DISCHARGE: ICD-10-CM

## 2024-10-10 DIAGNOSIS — G89.29 CHRONIC PAIN OF RIGHT KNEE: ICD-10-CM

## 2024-10-10 DIAGNOSIS — Z13.9 ENCOUNTER FOR SCREENING INVOLVING SOCIAL DETERMINANTS OF HEALTH (SDOH): ICD-10-CM

## 2024-10-10 DIAGNOSIS — Z23 NEED FOR VACCINATION: ICD-10-CM

## 2024-10-10 DIAGNOSIS — M25.561 CHRONIC PAIN OF RIGHT KNEE: ICD-10-CM

## 2024-10-10 LAB — PROLACTIN SERPL-MCNC: 8.1 UG/L (ref 3–20)

## 2024-10-10 PROCEDURE — 90656 IIV3 VACC NO PRSV 0.5 ML IM: CPT | Performed by: FAMILY MEDICINE

## 2024-10-10 PROCEDURE — 3008F BODY MASS INDEX DOCD: CPT | Performed by: FAMILY MEDICINE

## 2024-10-10 PROCEDURE — 90471 IMMUNIZATION ADMIN: CPT | Performed by: FAMILY MEDICINE

## 2024-10-10 PROCEDURE — 99213 OFFICE O/P EST LOW 20 MIN: CPT | Performed by: FAMILY MEDICINE

## 2024-10-10 RX ORDER — BACITRACIN ZINC 500 UNIT/G
OINTMENT (GRAM) TOPICAL 2 TIMES DAILY
Qty: 14 G | Refills: 0 | Status: SHIPPED | OUTPATIENT
Start: 2024-10-10

## 2024-10-10 RX ORDER — SULFAMETHOXAZOLE AND TRIMETHOPRIM 800; 160 MG/1; MG/1
1 TABLET ORAL 2 TIMES DAILY
Qty: 14 TABLET | Refills: 0 | Status: SHIPPED | OUTPATIENT
Start: 2024-10-10 | End: 2024-10-17

## 2024-10-10 SDOH — SOCIAL STABILITY: SOCIAL NETWORK: HOW OFTEN DO YOU ATTEND CHURCH OR RELIGIOUS SERVICES?: NEVER

## 2024-10-10 SDOH — SOCIAL STABILITY: SOCIAL INSECURITY: WITHIN THE LAST YEAR, HAVE YOU BEEN AFRAID OF YOUR PARTNER OR EX-PARTNER?: NO

## 2024-10-10 SDOH — ECONOMIC STABILITY: INCOME INSECURITY: IN THE LAST 12 MONTHS, WAS THERE A TIME WHEN YOU WERE NOT ABLE TO PAY THE MORTGAGE OR RENT ON TIME?: YES

## 2024-10-10 SDOH — SOCIAL STABILITY: SOCIAL NETWORK: HOW OFTEN DO YOU GET TOGETHER WITH FRIENDS OR RELATIVES?: MORE THAN THREE TIMES A WEEK

## 2024-10-10 SDOH — SOCIAL STABILITY: SOCIAL NETWORK
IN A TYPICAL WEEK, HOW MANY TIMES DO YOU TALK ON THE PHONE WITH FAMILY, FRIENDS, OR NEIGHBORS?: MORE THAN THREE TIMES A WEEK

## 2024-10-10 SDOH — HEALTH STABILITY: MENTAL HEALTH: HOW OFTEN DO YOU HAVE 6 OR MORE DRINKS ON ONE OCCASION?: NEVER

## 2024-10-10 SDOH — SOCIAL STABILITY: SOCIAL NETWORK
DO YOU BELONG TO ANY CLUBS OR ORGANIZATIONS SUCH AS CHURCH GROUPS UNIONS, FRATERNAL OR ATHLETIC GROUPS, OR SCHOOL GROUPS?: YES

## 2024-10-10 SDOH — ECONOMIC STABILITY: TRANSPORTATION INSECURITY
IN THE PAST 12 MONTHS, HAS LACK OF TRANSPORTATION KEPT YOU FROM MEETINGS, WORK, OR FROM GETTING THINGS NEEDED FOR DAILY LIVING?: NO

## 2024-10-10 SDOH — ECONOMIC STABILITY: INCOME INSECURITY: IN THE PAST 12 MONTHS, HAS THE ELECTRIC, GAS, OIL, OR WATER COMPANY THREATENED TO SHUT OFF SERVICE IN YOUR HOME?: NO

## 2024-10-10 SDOH — HEALTH STABILITY: MENTAL HEALTH: HOW OFTEN DO YOU HAVE A DRINK CONTAINING ALCOHOL?: MONTHLY OR LESS

## 2024-10-10 SDOH — HEALTH STABILITY: MENTAL HEALTH: HOW MANY STANDARD DRINKS CONTAINING ALCOHOL DO YOU HAVE ON A TYPICAL DAY?: 1 OR 2

## 2024-10-10 SDOH — SOCIAL STABILITY: SOCIAL NETWORK: ARE YOU MARRIED, WIDOWED, DIVORCED, SEPARATED, NEVER MARRIED, OR LIVING WITH A PARTNER?: DIVORCED

## 2024-10-10 SDOH — SOCIAL STABILITY: SOCIAL INSECURITY: WITHIN THE LAST YEAR, HAVE YOU BEEN HUMILIATED OR EMOTIONALLY ABUSED IN OTHER WAYS BY YOUR PARTNER OR EX-PARTNER?: NO

## 2024-10-10 SDOH — HEALTH STABILITY: MENTAL HEALTH
STRESS IS WHEN SOMEONE FEELS TENSE, NERVOUS, ANXIOUS, OR CAN'T SLEEP AT NIGHT BECAUSE THEIR MIND IS TROUBLED. HOW STRESSED ARE YOU?: NOT AT ALL

## 2024-10-10 SDOH — HEALTH STABILITY: PHYSICAL HEALTH: ON AVERAGE, HOW MANY DAYS PER WEEK DO YOU ENGAGE IN MODERATE TO STRENUOUS EXERCISE (LIKE A BRISK WALK)?: 0 DAYS

## 2024-10-10 SDOH — SOCIAL STABILITY: SOCIAL INSECURITY
WITHIN THE LAST YEAR, HAVE YOU BEEN KICKED, HIT, SLAPPED, OR OTHERWISE PHYSICALLY HURT BY YOUR PARTNER OR EX-PARTNER?: NO

## 2024-10-10 SDOH — ECONOMIC STABILITY: HOUSING INSECURITY: IN THE PAST 12 MONTHS, HOW MANY TIMES HAVE YOU MOVED WHERE YOU WERE LIVING?: 1

## 2024-10-10 SDOH — ECONOMIC STABILITY: HOUSING INSECURITY: AT ANY TIME IN THE PAST 12 MONTHS, WERE YOU HOMELESS OR LIVING IN A SHELTER (INCLUDING NOW)?: NO

## 2024-10-10 SDOH — ECONOMIC STABILITY: TRANSPORTATION INSECURITY
IN THE PAST 12 MONTHS, HAS THE LACK OF TRANSPORTATION KEPT YOU FROM MEDICAL APPOINTMENTS OR FROM GETTING MEDICATIONS?: NO

## 2024-10-10 SDOH — HEALTH STABILITY: PHYSICAL HEALTH: ON AVERAGE, HOW MANY MINUTES DO YOU ENGAGE IN EXERCISE AT THIS LEVEL?: 0 MIN

## 2024-10-10 SDOH — ECONOMIC STABILITY: FOOD INSECURITY: WITHIN THE PAST 12 MONTHS, YOU WORRIED THAT YOUR FOOD WOULD RUN OUT BEFORE YOU GOT MONEY TO BUY MORE.: OFTEN TRUE

## 2024-10-10 SDOH — SOCIAL STABILITY: SOCIAL INSECURITY
WITHIN THE LAST YEAR, HAVE TO BEEN RAPED OR FORCED TO HAVE ANY KIND OF SEXUAL ACTIVITY BY YOUR PARTNER OR EX-PARTNER?: NO

## 2024-10-10 SDOH — ECONOMIC STABILITY: FOOD INSECURITY: WITHIN THE PAST 12 MONTHS, THE FOOD YOU BOUGHT JUST DIDN'T LAST AND YOU DIDN'T HAVE MONEY TO GET MORE.: OFTEN TRUE

## 2024-10-10 SDOH — SOCIAL STABILITY: SOCIAL NETWORK: HOW OFTEN DO YOU ATTENT MEETINGS OF THE CLUB OR ORGANIZATION YOU BELONG TO?: 1 TO 4 TIMES PER YEAR

## 2024-10-10 ASSESSMENT — PATIENT HEALTH QUESTIONNAIRE - PHQ9
SUM OF ALL RESPONSES TO PHQ9 QUESTIONS 1 AND 2: 0
1. LITTLE INTEREST OR PLEASURE IN DOING THINGS: NOT AT ALL
2. FEELING DOWN, DEPRESSED OR HOPELESS: NOT AT ALL

## 2024-10-10 ASSESSMENT — LIFESTYLE VARIABLES
AUDIT-C TOTAL SCORE: 1
SKIP TO QUESTIONS 9-10: 1

## 2024-10-10 NOTE — PROGRESS NOTES
"Subjective   Patient ID: Jill Toledo is a 39 y.o. female who presents for Annual Exam (Pt is here for c/o left nipple drainage with a smell, and she noticed a lump.).    HPI       Here for a physical- but with several issues- will reschedule for wellness later  Does not want a chaperone.     Other concerns/issues/followup:  1 - Knee pain with  feeling liek a bone is sticking out X 1 yr  Pain with putting pressor on this    2 -  Nipple mass and white leakage with odd X 1 yr now,         [facility].     ---Social---  Job: not working currently- was with [employer]  : , dating boyfriend  Kids: 2 kids (12/13) and #1- 3y/o- has custody of her family's children  Likes: loves motivating people, time with kids  No foreign travel plans         ETOH - none  Drugs - none  Tobacco - none      Review of Systems  All systems reviewed and neg if not noted in the HPI above     Objective   /88 (Patient Position: Sitting)   Pulse 88   Temp 36.1 °C (97 °F)   Resp 14   Ht 1.676 m (5' 6\")   Wt 82.1 kg (181 lb)   SpO2 98%   BMI 29.21 kg/m²     Physical Exam  Constitutional:       Appearance: Normal appearance.   HENT:      Head: Normocephalic.      Right Ear: External ear normal.      Left Ear: External ear normal.      Nose: Nose normal.      Mouth/Throat:      Pharynx: Oropharynx is clear.   Eyes:      Extraocular Movements: Extraocular movements intact.   Cardiovascular:      Rate and Rhythm: Normal rate and regular rhythm.      Heart sounds: Normal heart sounds. No murmur heard.  Pulmonary:      Effort: Pulmonary effort is normal. No respiratory distress.      Breath sounds: Normal breath sounds. No wheezing.   Chest:      Chest wall: Mass present.   Breasts:     Right: Normal.      Left: Nipple discharge present.       Abdominal:      General: Bowel sounds are normal.      Palpations: Abdomen is soft. There is no mass.      Tenderness: There is no abdominal tenderness. "   Musculoskeletal:         General: Normal range of motion.      Cervical back: Normal range of motion.      Right lower leg: No edema.      Left lower leg: No edema.   Skin:     General: Skin is warm and dry.      Findings: No rash.   Neurological:      General: No focal deficit present.      Mental Status: She is alert and oriented to person, place, and time.      Motor: No weakness.   Psychiatric:         Mood and Affect: Mood normal.         Behavior: Behavior normal.           Assessment/Plan   Problem List Items Addressed This Visit    None  Visit Diagnoses         Codes    Wellness examination    -  Primary Z00.00    Encounter for screening mammogram for breast cancer     Z12.31    Relevant Orders    BI mammo bilateral screening tomosynthesis    Mass of nipple     N63.0    Relevant Orders    BI mammo left diagnostic    BI US breast complete left    Prolactin level    Nipple discharge     N64.52    Relevant Medications    bacitracin 500 unit/gram ointment    sulfamethoxazole-trimethoprim (Bactrim DS) 800-160 mg tablet    Other Relevant Orders    BI mammo left diagnostic    BI US breast complete left    Prolactin level    Encounter for screening involving social determinants of health (SDoH)     Z13.9    Relevant Orders    Referral to Community Health Worker    Chronic pain of right knee     M25.561, G89.29    Relevant Orders    XR knee right 3 views                   Please follow up in 1yr wellness  - or as needed.

## 2024-10-10 NOTE — PROGRESS NOTES
CHW phoned pt to discuss her [facility] referral for medical insurance, dental, education, employment, and internet assistance.W gave pt the number for medical and dental insurance @[phone] and the number for employment @[phone]. [facility] also received permision to sign pt up with [employer] for internet assistance.

## 2024-10-10 NOTE — PATIENT INSTRUCTIONS
Nipple  - Abx twice daily X 7 days  - Topical abx- twice daily X 7 days  - Area is clean and dry  - add lab first- then can call for mammogram    Nipple mass  - Mammogram and ultrasound      Knee pain  - try home exercise  - ordered PT  - ordered knee xray     Once insurance we can referral for genetic testing    Referral was placed to our community health workers regarding the needs you expressed in the office.   One of our community health workers  (Rosanne Walker) will be contacting you within the next 5 business days  *If  she does not call you, you can call them at- [phone]            Please follow up in 6month wellness  - or as needed.       ** If labs or imaging ordered at today's visit, all the non-urgent results will be discussed at your next visit    If you have been referred for a special test or to a specialist please call  [phone] to schedule an appointment.  If you have any further questions, or if develop new or worsened symptoms, please give our office a call at [phone].

## 2024-10-25 ENCOUNTER — HOSPITAL ENCOUNTER (OUTPATIENT)
Dept: RADIOLOGY | Facility: HOSPITAL | Age: 39
Discharge: HOME | End: 2024-10-25
Payer: MEDICAID

## 2024-10-25 VITALS — BODY MASS INDEX: 29.09 KG/M2 | HEIGHT: 66 IN | WEIGHT: 181 LBS

## 2024-10-25 DIAGNOSIS — N64.52 NIPPLE DISCHARGE: ICD-10-CM

## 2024-10-25 DIAGNOSIS — N63.20 MASS OF LEFT BREAST, UNSPECIFIED QUADRANT: ICD-10-CM

## 2024-10-25 PROCEDURE — 77062 BREAST TOMOSYNTHESIS BI: CPT

## 2024-10-25 PROCEDURE — 76981 USE PARENCHYMA: CPT | Mod: LT,RT

## 2024-10-25 PROCEDURE — 76642 ULTRASOUND BREAST LIMITED: CPT | Mod: LT

## 2024-11-05 ENCOUNTER — APPOINTMENT (OUTPATIENT)
Dept: RADIOLOGY | Facility: CLINIC | Age: 39
End: 2024-11-05
Payer: MEDICAID

## 2024-11-05 ENCOUNTER — HOSPITAL ENCOUNTER (OUTPATIENT)
Dept: RADIOLOGY | Facility: EXTERNAL LOCATION | Age: 39
Discharge: HOME | End: 2024-11-05

## 2025-04-15 ENCOUNTER — HOSPITAL ENCOUNTER (OUTPATIENT)
Dept: RADIOLOGY | Facility: CLINIC | Age: 40
Discharge: HOME | End: 2025-04-15
Payer: COMMERCIAL

## 2025-04-15 ENCOUNTER — APPOINTMENT (OUTPATIENT)
Dept: PRIMARY CARE | Facility: CLINIC | Age: 40
End: 2025-04-15

## 2025-04-15 VITALS
HEIGHT: 66 IN | SYSTOLIC BLOOD PRESSURE: 110 MMHG | RESPIRATION RATE: 14 BRPM | HEART RATE: 78 BPM | DIASTOLIC BLOOD PRESSURE: 80 MMHG | BODY MASS INDEX: 30.05 KG/M2 | OXYGEN SATURATION: 98 % | WEIGHT: 187 LBS | TEMPERATURE: 97 F

## 2025-04-15 DIAGNOSIS — F41.9 ANXIETY: ICD-10-CM

## 2025-04-15 DIAGNOSIS — N64.52 NIPPLE DISCHARGE: ICD-10-CM

## 2025-04-15 DIAGNOSIS — M54.50 CHRONIC LOW BACK PAIN, UNSPECIFIED BACK PAIN LATERALITY, UNSPECIFIED WHETHER SCIATICA PRESENT: ICD-10-CM

## 2025-04-15 DIAGNOSIS — G89.29 CHRONIC PAIN OF RIGHT KNEE: ICD-10-CM

## 2025-04-15 DIAGNOSIS — N61.0 MASTITIS: Primary | ICD-10-CM

## 2025-04-15 DIAGNOSIS — G89.29 CHRONIC LOW BACK PAIN, UNSPECIFIED BACK PAIN LATERALITY, UNSPECIFIED WHETHER SCIATICA PRESENT: ICD-10-CM

## 2025-04-15 DIAGNOSIS — M25.561 CHRONIC PAIN OF RIGHT KNEE: ICD-10-CM

## 2025-04-15 PROCEDURE — 3008F BODY MASS INDEX DOCD: CPT | Performed by: FAMILY MEDICINE

## 2025-04-15 PROCEDURE — 72100 X-RAY EXAM L-S SPINE 2/3 VWS: CPT

## 2025-04-15 PROCEDURE — 99213 OFFICE O/P EST LOW 20 MIN: CPT | Performed by: FAMILY MEDICINE

## 2025-04-15 PROCEDURE — 73562 X-RAY EXAM OF KNEE 3: CPT | Mod: RT

## 2025-04-15 RX ORDER — AMOXICILLIN AND CLAVULANATE POTASSIUM 875; 125 MG/1; MG/1
875 TABLET, FILM COATED ORAL 2 TIMES DAILY
Qty: 14 TABLET | Refills: 0 | Status: SHIPPED | OUTPATIENT
Start: 2025-04-15 | End: 2025-04-22

## 2025-04-15 NOTE — PATIENT INSTRUCTIONS
"Anxiety  - Placed a referral to see the Psychology team.  - Please check out [email or web address] to find your very own counselor you would feel comfortable with.  -Today I taught you how to navigate the [email or web address] website so you can pick out your therapist  --- Suggested watching \"The Secret\" on Amazon (or on YouTube it's called: \"The Secret full movie English\")  This is a motivational video that talks about the 'Law of Attraction' (positive thinking and speaking)- a principal the can redirect how you think and help you in your daily life!      Nipple discharge  -try Augmentin  - referral breast team      Back   -Topical creams- Voltaren gel, Salonpas, Icy hot, Bio freeze, Capsaicin, Asper cream, or Tiger balm (these are all over the counter)   - referral for PMR  - xray ordered  - ordered PT      Please follow up in   - 6months for wellness  - or as needed.       ** If labs or imaging ordered at today's visit, all the non-urgent results will be discussed at your next visit    If you have been referred for a special test or to a specialist please call  [phone] to schedule an appointment.  If you have any further questions, or if develop new or worsened symptoms, please give our office a call at [phone].         "

## 2025-04-15 NOTE — PROGRESS NOTES
"Subjective   Patient ID: Jill Toledo is a 40 y.o. female who presents for Follow-up (Anxiety/Left breast).    HPI     C/o of Left breast with nipple draining with odor  Mammogram and US were nml    C/o Back pain X years  Using topical creams and helps some  Denies loss of control of bowel or bladder, saddle paresthesia, leg weakness or leg paresthesia    C/o  Anxiety  No depression      Review of Systems  All systems reviewed and neg if not noted in the HPI above       Objective   /80 (Patient Position: Sitting)   Pulse 78   Temp 36.1 °C (97 °F)   Resp 14   Ht 1.676 m (5' 6\")   Wt 84.8 kg (187 lb)   SpO2 98%   BMI 30.18 kg/m²     Physical Exam  Constitutional:       Appearance: Normal appearance.   HENT:      Head: Normocephalic.   Eyes:      Extraocular Movements: Extraocular movements intact.      Conjunctiva/sclera: Conjunctivae normal.      Pupils: Pupils are equal, round, and reactive to light.   Pulmonary:      Effort: Pulmonary effort is normal.   Musculoskeletal:         General: No deformity or signs of injury.      Comments:   Patient appears to be in mild to moderate pain, non-antalgic gait noted.   paraspinal muscles TTP at *L5-S1  No masses.  Painful and reduced LS ROM noted.   Straight leg raise is neg.   DTR's, motor strength and sensation normal, including heel and toe gait.    Peripheral pulses are palpable.    Skin:     Coloration: Skin is not jaundiced.      Findings: No rash.   Neurological:      Mental Status: She is alert and oriented to person, place, and time. Mental status is at baseline.   Psychiatric:         Mood and Affect: Mood normal.         Assessment/Plan   Assessment & Plan  Mastitis    Orders:    amoxicillin-pot clavulanate (Augmentin) 875-125 mg tablet; Take 1 tablet (875 mg) by mouth 2 times a day for 7 days.    Referral to Breast Surgery; Future    Chronic low back pain, unspecified back pain laterality, unspecified whether sciatica present  -Topical creams- " "Voltaren gel, Salonpas, Icy hot, Bio freeze, Capsaicin, Asper cream, or Tiger balm (these are all over the counter)   - referral for PMR  - xray ordered  - ordered PT  Orders:    Referral to Physical Therapy; Future    Referral to Physical Medicine Rehab; Future    XR lumbar spine 2-3 views; Future    Anxiety  - Placed a referral to see the Psychology team.  - Please check out [email or web address] to find your very own counselor you would feel comfortable with.  -Today I taught you how to navigate the [email or web address] website so you can pick out your therapist  --- Suggested watching \"The Secret\" on Amazon (or on [employer] it's called: \"The Secret full movie English\")  This is a motivational video that talks about the 'Law of Attraction' (positive thinking and speaking)- a principal the can redirect how you think and help you in your daily life!  Orders:    Referral to Psychology; Future    Nipple discharge    Orders:    amoxicillin-pot clavulanate (Augmentin) 875-125 mg tablet; Take 1 tablet (875 mg) by mouth 2 times a day for 7 days.    Referral to Breast Surgery; Future          Please follow up in   - 6months for wellness  - or as needed.      "

## 2025-04-15 NOTE — ASSESSMENT & PLAN NOTE
"- Placed a referral to see the Psychology team.  - Please check out [email or web address] to find your very own counselor you would feel comfortable with.  -Today I taught you how to navigate the [email or web address] website so you can pick out your therapist  --- Suggested watching \"The Secret\" on Amazon (or on [employer] it's called: \"The Secret full movie English\")  This is a motivational video that talks about the 'Law of Attraction' (positive thinking and speaking)- a principal the can redirect how you think and help you in your daily life!  Orders:    Referral to Psychology; Future    "

## 2025-04-16 DIAGNOSIS — N64.52 DISCHARGE FROM LEFT NIPPLE: ICD-10-CM

## 2025-04-18 ENCOUNTER — HOSPITAL ENCOUNTER (OUTPATIENT)
Dept: RADIOLOGY | Facility: HOSPITAL | Age: 40
Discharge: HOME | End: 2025-04-18
Payer: COMMERCIAL

## 2025-04-18 ENCOUNTER — OFFICE VISIT (OUTPATIENT)
Dept: SURGICAL ONCOLOGY | Facility: HOSPITAL | Age: 40
End: 2025-04-18
Payer: COMMERCIAL

## 2025-04-18 VITALS
BODY MASS INDEX: 30.6 KG/M2 | OXYGEN SATURATION: 97 % | HEIGHT: 66 IN | WEIGHT: 190.4 LBS | RESPIRATION RATE: 20 BRPM | DIASTOLIC BLOOD PRESSURE: 89 MMHG | TEMPERATURE: 96.8 F | HEART RATE: 77 BPM | SYSTOLIC BLOOD PRESSURE: 127 MMHG

## 2025-04-18 DIAGNOSIS — N64.52 NIPPLE DISCHARGE: ICD-10-CM

## 2025-04-18 DIAGNOSIS — N64.52 DISCHARGE FROM LEFT NIPPLE: ICD-10-CM

## 2025-04-18 DIAGNOSIS — N61.0 MASTITIS: ICD-10-CM

## 2025-04-18 PROCEDURE — 99213 OFFICE O/P EST LOW 20 MIN: CPT

## 2025-04-18 PROCEDURE — 1036F TOBACCO NON-USER: CPT

## 2025-04-18 PROCEDURE — 76642 ULTRASOUND BREAST LIMITED: CPT | Mod: LT

## 2025-04-18 PROCEDURE — 99203 OFFICE O/P NEW LOW 30 MIN: CPT

## 2025-04-18 PROCEDURE — 77061 BREAST TOMOSYNTHESIS UNI: CPT | Mod: LT

## 2025-04-18 PROCEDURE — 3008F BODY MASS INDEX DOCD: CPT

## 2025-04-18 ASSESSMENT — PAIN SCALES - GENERAL: PAINLEVEL_OUTOF10: 0-NO PAIN

## 2025-04-18 NOTE — PROGRESS NOTES
Jill Toledo female   1985 40 y.o.   [id number]      Chief Complaint  New patient, non spontaneous left nipple discharge     History Of Present Illness  Jill Toledo is a 40 y.o. female presenting with non spontaneous white left nipple discharge. This has been persistent for a few years. Reports she squeezes her nipple to see if any discharge is produced. Non spontaneous bloody nipple discharge when she does manipulate the nipple too hard. States it can be foul smelling at times. She has had bilateral nipples pierced for 2 years and removed a year ago. Recently treated with antibiotics for this issue.She denies breast biopsy or surgery. She denies family history breast cancer.      BREAST IMAGING: 10/25/2024 bilateral diagnostic mammogram and left breast ultrasound BI-RADS Category 1. 2025 LEFT diagnostic mammogram and ultrasound BI-RADS Category 1.     REPRODUCTIVE HISTORY: menarche age 9, , first birth age 25,  8 months, OCP's 0-5 years, premenopausal, no HRT, heterogeneously dense breast tissue                                   FAMILY CANCER HISTORY:   Maternal grandmother: Stomach cancer   Maternal aunt: Stomach cancer      Surgical History  She has a past surgical history that includes Other surgical history (10/22/2020) and Other surgical history (10/22/2020).     Social History  She reports that she has never smoked. She has never used smokeless tobacco. She reports current drug use. Drug: Marijuana. She reports that she does not drink alcohol.    Family History  Family History[1]     Allergies  Patient has no known allergies.    Medications  Current Outpatient Medications   Medication Instructions    amoxicillin-pot clavulanate (Augmentin) 875-125 mg tablet 875 mg, oral, 2 times daily    bacitracin 500 unit/gram ointment Topical, 2 times daily         REVIEW OF SYSTEMS    Constitutional:  Negative for appetite change, fatigue, fever and unexpected weight change.   HENT:  Negative for ear  pain, hearing loss, nosebleeds, sore throat and trouble swallowing.    Eyes:  Negative for discharge, itching and visual disturbance.   Respiratory:  Negative for cough, chest tightness and shortness of breath.    Cardiovascular:  Negative for chest pain, palpitations and leg swelling.   Breast: as indicated in HPI  Gastrointestinal:  Negative for abdominal pain, constipation, diarrhea and nausea.   Endocrine: Negative for cold intolerance and heat intolerance.   Genitourinary:  Negative for dysuria, frequency, hematuria, pelvic pain and vaginal bleeding.   Musculoskeletal:  Negative for arthralgias, back pain, gait problem, joint swelling and myalgias.   Skin:  Negative for color change and rash.   Allergic/Immunologic: Negative for environmental allergies and food allergies.   Neurological:  Negative for dizziness, tremors, speech difficulty, weakness, numbness and headaches.   Hematological:  Does not bruise/bleed easily.   Psychiatric/Behavioral:  Negative for agitation, dysphoric mood and sleep disturbance. The patient is not nervous/anxious.         Past Medical History  She has a past medical history of Other signs and symptoms in breast (10/25/2020), Personal history of other mental and behavioral disorders (10/22/2020), and Tobacco abuse counseling (10/22/2020).     Physical Exam  Patient is alert and oriented x3 and in a relaxed and appropriate mood. Her gait is steady and hand grasps are equal. Sclera is clear. The breasts are nearly symmetrical. The tissue is soft without palpable abnormalities, discrete nodules or masses. The skin and nipples appear normal. There is no cervical, supraclavicular or axillary lymphadenopathy. Heart rate and rhythm normal, S1 and S2 appreciated. The lungs are clear to auscultation bilaterally. Abdomen is soft and non-tender.       Physical Exam     Last Recorded Vitals  Vitals:    04/18/25 1057   BP: 127/89   Pulse: 77   Resp: 20   Temp: 36 °C (96.8 °F)   SpO2: 97%        Relevant Results   Time was spent viewing digital images of the radiology testing with the patient. I explained the results in depth, along with suggested explanation for follow up recommendations based on the testing results. BI-RADS Category 1    Imaging  Narrative & Impression   Interpreted By:  Radhika Moore and Marshall Colin   STUDY:  BI MAMMO LEFT DIAGNOSTIC TOMOSYNTHESIS; BI US BREAST LIMITED LEFT;  4/18/2025 10:04 am; 4/18/2025 10:43 am      ACCESSION NUMBER(S):  [id number]; [id number]      ORDERING CLINICIAN:  ALCON MULLINS      INDICATION:  The patient reports continued left nipple discharge both spontaneous  and non spontaneous which has been pus-like, bloody, clear and white  at different times for the past 4 years. The patient reports that her  nipple was previously pierced, although this was taken out over a  year ago. The patient also reports a palpable area adjacent to the  nipple which the patient has noticed for years. The patient is  currently on day 3 of antibiotics.      ,N64.52 Nipple discharge      COMPARISON:  10/25/2024 and 05/13/2023.      FINDINGS:  MAMMOGRAPHY: 2D and tomosynthesis images were reviewed at 1 mm slice  thickness.      Density:  The breasts are heterogeneously dense, which may obscure  small masses.      No suspicious masses or calcifications are identified in the nipple,  subareolar region or the remainder of the left breast.      ULTRASOUND: Targeted ultrasound was performed of the subareolar left  breast by a registered sonographer with elastography.      No sonographic abnormality, ductal dilatation or intraductal mass is  visualized in the subareolar region or within the patient's palpable  area of concern in the periareolar region. The tissue is soft on  elastography.      IMPRESSION:  No mammographic or targeted sonographic abnormality or evidence of  malignancy in the left breast to account for the patient's persistent  left nipple discharge.  Recommend clinical follow-up and continued  annual screening mammograms. If symptoms persist, breast MRI is  recommended.      BI-RADS CATEGORY:  BI-RADS Category:  1 Negative.  Recommendation:  Clinical Follow-up and Continued Annual Screening.  Recommended Date:  Immediate.  Laterality:  Left.              Assessment/Plan     Normal clinical breast exam and imaging, left nipple discharge, no family history breast cancer.     Patient Discussion/Summary  Your clinical examination and imaging are normal. You no longer need to be seen by a breast specialist for an annual physical breast examination. It is important to continue annual screening mammograms and breast exams through your primary care provider. Please return to see me if you have a new breast problem or abnormal mammogram. It has been a pleasure having you as a patient.     You can see your health information, review clinical summaries from office visits & test results online when you follow your health with MY  Chart, a personal health record. To sign up go to [email or web address]. If you need assistance with signing up or trouble getting into your account call [employer] Patient Line 24/7 at [phone].    My office phone number is [phone] if you need to get in touch with me or have additional questions or concerns. Thank you for choosing [facility] and trusting me as your healthcare provider. I am honored to be a provider on your health care team and I remain dedicated to helping you achieve your health goals.       Haley Albrecht, APRN-CNP         [1]   Family History  Problem Relation Name Age of Onset    Hypertension Mother      Hyperlipidemia Mother      Alcohol abuse Father      Kidney failure Father      Stomach cancer Maternal Grandmother      Breast cancer Other      Stomach cancer Other auntie willow     Breast cancer Other auntie silvia     Stomach cancer Cousin antiClimax     Breast cancer Cousin Camila

## 2025-10-22 ENCOUNTER — APPOINTMENT (OUTPATIENT)
Dept: PRIMARY CARE | Facility: CLINIC | Age: 40
End: 2025-10-22
Payer: COMMERCIAL